# Patient Record
Sex: FEMALE | Employment: OTHER | URBAN - METROPOLITAN AREA
[De-identification: names, ages, dates, MRNs, and addresses within clinical notes are randomized per-mention and may not be internally consistent; named-entity substitution may affect disease eponyms.]

---

## 2017-09-21 RX ORDER — ACETAMINOPHEN 325 MG/1
650 TABLET ORAL EVERY 6 HOURS PRN
COMMUNITY

## 2017-09-21 RX ORDER — CITALOPRAM 40 MG/1
40 TABLET ORAL DAILY
COMMUNITY

## 2017-09-21 RX ORDER — CARBAMAZEPINE 400 MG/1
400 TABLET, EXTENDED RELEASE ORAL 2 TIMES DAILY
COMMUNITY

## 2017-09-21 RX ORDER — ASPIRIN 81 MG/1
81 TABLET ORAL DAILY
COMMUNITY

## 2017-09-21 RX ORDER — POLYETHYLENE GLYCOL 3350 17 G/17G
17 POWDER, FOR SOLUTION ORAL DAILY
COMMUNITY

## 2017-09-21 RX ORDER — B-COMPLEX WITH VITAMIN C
1 TABLET ORAL
COMMUNITY

## 2017-09-21 RX ORDER — LORAZEPAM 1 MG/1
1 TABLET ORAL 2 TIMES DAILY
COMMUNITY

## 2017-09-21 RX ORDER — RISPERIDONE 0.25 MG/1
0.25 TABLET, FILM COATED ORAL 2 TIMES DAILY
COMMUNITY

## 2017-09-21 RX ORDER — MULTIVITAMIN
1 TABLET ORAL DAILY
COMMUNITY

## 2017-09-21 RX ORDER — HYDROXYZINE 50 MG/1
50 TABLET, FILM COATED ORAL 3 TIMES DAILY
COMMUNITY

## 2017-09-21 RX ORDER — DOCUSATE SODIUM 100 MG/1
100 CAPSULE, LIQUID FILLED ORAL 2 TIMES DAILY
COMMUNITY

## 2017-09-21 RX ORDER — ATORVASTATIN CALCIUM 10 MG/1
10 TABLET, FILM COATED ORAL
COMMUNITY

## 2017-09-21 RX ORDER — IBUPROFEN 200 MG
TABLET ORAL EVERY 6 HOURS PRN
COMMUNITY

## 2017-09-22 ENCOUNTER — ANESTHESIA EVENT (OUTPATIENT)
Dept: PERIOP | Facility: HOSPITAL | Age: 47
End: 2017-09-22
Payer: MEDICARE

## 2017-09-22 PROBLEM — K05.30 ADULT PERIODONTITIS: Chronic | Status: ACTIVE | Noted: 2017-09-22

## 2017-09-25 ENCOUNTER — HOSPITAL ENCOUNTER (OUTPATIENT)
Dept: RADIOLOGY | Facility: HOSPITAL | Age: 47
Setting detail: OUTPATIENT SURGERY
Discharge: HOME | End: 2017-09-25
Payer: MEDICARE

## 2017-09-25 ENCOUNTER — ANESTHESIA (OUTPATIENT)
Dept: PERIOP | Facility: HOSPITAL | Age: 47
End: 2017-09-25
Payer: MEDICARE

## 2017-09-25 ENCOUNTER — HOSPITAL ENCOUNTER (OUTPATIENT)
Facility: HOSPITAL | Age: 47
Setting detail: OUTPATIENT SURGERY
Discharge: OTHER INSTITUTION | End: 2017-09-25
Attending: DENTIST | Admitting: DENTIST
Payer: MEDICARE

## 2017-09-25 VITALS
HEART RATE: 81 BPM | TEMPERATURE: 98.4 F | SYSTOLIC BLOOD PRESSURE: 131 MMHG | OXYGEN SATURATION: 99 % | HEIGHT: 65 IN | RESPIRATION RATE: 18 BRPM | BODY MASS INDEX: 24.09 KG/M2 | WEIGHT: 144.6 LBS | DIASTOLIC BLOOD PRESSURE: 60 MMHG

## 2017-09-25 PROBLEM — K05.30 ADULT PERIODONTITIS: Chronic | Status: RESOLVED | Noted: 2017-09-22 | Resolved: 2017-09-25

## 2017-09-25 LAB — EXT PREGNANCY TEST URINE: NEGATIVE

## 2017-09-25 PROCEDURE — 81025 URINE PREGNANCY TEST: CPT | Performed by: DENTIST

## 2017-09-25 RX ORDER — SODIUM CHLORIDE, SODIUM LACTATE, POTASSIUM CHLORIDE, CALCIUM CHLORIDE 600; 310; 30; 20 MG/100ML; MG/100ML; MG/100ML; MG/100ML
INJECTION, SOLUTION INTRAVENOUS CONTINUOUS PRN
Status: DISCONTINUED | OUTPATIENT
Start: 2017-09-25 | End: 2017-09-25 | Stop reason: SURG

## 2017-09-25 RX ORDER — CHLORHEXIDINE GLUCONATE 0.12 MG/ML
RINSE ORAL AS NEEDED
Status: DISCONTINUED | OUTPATIENT
Start: 2017-09-25 | End: 2017-09-25 | Stop reason: HOSPADM

## 2017-09-25 RX ORDER — ONDANSETRON 2 MG/ML
INJECTION INTRAMUSCULAR; INTRAVENOUS AS NEEDED
Status: DISCONTINUED | OUTPATIENT
Start: 2017-09-25 | End: 2017-09-25 | Stop reason: SURG

## 2017-09-25 RX ORDER — GLYCOPYRROLATE 0.2 MG/ML
INJECTION INTRAMUSCULAR; INTRAVENOUS AS NEEDED
Status: DISCONTINUED | OUTPATIENT
Start: 2017-09-25 | End: 2017-09-25 | Stop reason: SURG

## 2017-09-25 RX ORDER — ONDANSETRON 2 MG/ML
4 INJECTION INTRAMUSCULAR; INTRAVENOUS ONCE AS NEEDED
Status: DISCONTINUED | OUTPATIENT
Start: 2017-09-25 | End: 2017-09-25 | Stop reason: HOSPADM

## 2017-09-25 RX ORDER — FENTANYL CITRATE 50 UG/ML
INJECTION, SOLUTION INTRAMUSCULAR; INTRAVENOUS AS NEEDED
Status: DISCONTINUED | OUTPATIENT
Start: 2017-09-25 | End: 2017-09-25 | Stop reason: SURG

## 2017-09-25 RX ORDER — MAGNESIUM HYDROXIDE 1200 MG/15ML
LIQUID ORAL AS NEEDED
Status: DISCONTINUED | OUTPATIENT
Start: 2017-09-25 | End: 2017-09-25 | Stop reason: HOSPADM

## 2017-09-25 RX ORDER — CLINDAMYCIN PHOSPHATE 600 MG/50ML
600 INJECTION INTRAVENOUS
Status: DISCONTINUED | OUTPATIENT
Start: 2017-09-25 | End: 2017-09-25 | Stop reason: HOSPADM

## 2017-09-25 RX ORDER — LIDOCAINE HYDROCHLORIDE 10 MG/ML
INJECTION, SOLUTION INFILTRATION; PERINEURAL AS NEEDED
Status: DISCONTINUED | OUTPATIENT
Start: 2017-09-25 | End: 2017-09-25 | Stop reason: SURG

## 2017-09-25 RX ORDER — ROCURONIUM BROMIDE 10 MG/ML
INJECTION, SOLUTION INTRAVENOUS AS NEEDED
Status: DISCONTINUED | OUTPATIENT
Start: 2017-09-25 | End: 2017-09-25 | Stop reason: SURG

## 2017-09-25 RX ORDER — BUPIVACAINE HYDROCHLORIDE AND EPINEPHRINE 5; 5 MG/ML; UG/ML
INJECTION, SOLUTION PERINEURAL AS NEEDED
Status: DISCONTINUED | OUTPATIENT
Start: 2017-09-25 | End: 2017-09-25 | Stop reason: HOSPADM

## 2017-09-25 RX ORDER — PROPOFOL 10 MG/ML
INJECTION, EMULSION INTRAVENOUS AS NEEDED
Status: DISCONTINUED | OUTPATIENT
Start: 2017-09-25 | End: 2017-09-25 | Stop reason: SURG

## 2017-09-25 RX ADMIN — GLYCOPYRROLATE 0.6 MG: 0.2 INJECTION, SOLUTION INTRAMUSCULAR; INTRAVENOUS at 11:42

## 2017-09-25 RX ADMIN — ONDANSETRON 4 MG: 2 INJECTION INTRAMUSCULAR; INTRAVENOUS at 11:54

## 2017-09-25 RX ADMIN — FENTANYL CITRATE 100 MCG: 50 INJECTION, SOLUTION INTRAMUSCULAR; INTRAVENOUS at 10:30

## 2017-09-25 RX ADMIN — NEOSTIGMINE METHYLSULFATE 3 MG: 1 INJECTION, SOLUTION INTRAMUSCULAR; INTRAVENOUS; SUBCUTANEOUS at 11:42

## 2017-09-25 RX ADMIN — ROCURONIUM BROMIDE 30 MG: 10 INJECTION, SOLUTION INTRAVENOUS at 10:30

## 2017-09-25 RX ADMIN — DEXAMETHASONE SODIUM PHOSPHATE 8 MG: 10 INJECTION INTRAMUSCULAR; INTRAVENOUS at 10:36

## 2017-09-25 RX ADMIN — CLINDAMYCIN PHOSPHATE 600 MG: 12 INJECTION, SOLUTION INTRAMUSCULAR; INTRAVENOUS at 10:35

## 2017-09-25 RX ADMIN — SODIUM CHLORIDE, SODIUM LACTATE, POTASSIUM CHLORIDE, AND CALCIUM CHLORIDE: .6; .31; .03; .02 INJECTION, SOLUTION INTRAVENOUS at 10:23

## 2017-09-25 RX ADMIN — PROPOFOL 200 MG: 10 INJECTION, EMULSION INTRAVENOUS at 10:30

## 2017-09-25 RX ADMIN — LIDOCAINE HYDROCHLORIDE 50 MG: 10 INJECTION, SOLUTION INFILTRATION; PERINEURAL at 10:30

## 2019-11-07 ENCOUNTER — ANESTHESIA EVENT (OUTPATIENT)
Dept: PERIOP | Facility: HOSPITAL | Age: 49
End: 2019-11-07
Payer: COMMERCIAL

## 2019-11-07 NOTE — ANESTHESIA PREPROCEDURE EVALUATION
Review of Systems/Medical History  Patient summary reviewed  Chart reviewed  No history of anesthetic complications     Cardiovascular  Hyperlipidemia,    Pulmonary       GI/Hepatic    GERD ,             Endo/Other     GYN       Hematology  Anemia ,     Musculoskeletal       Neurology  Seizures well controlled,     Psychology   Psychiatric history (Autism, mental challenge, OCD, PDD), Anxiety, Depression ,              Physical Exam    Airway  Comment: Patient uncooperative           Dental   Comment: Patient uncooperative,     Cardiovascular  Rhythm: regular, Rate: normal,     Pulmonary  Breath sounds clear to auscultation,     Other Findings        Anesthesia Plan  ASA Score- 3     Anesthesia Type- general with ASA Monitors  Additional Monitors:   Airway Plan: NTT  Plan Factors-    Induction- intravenous  Postoperative Plan- Plan for postoperative opioid use  Planned trial extubation    Informed Consent- Anesthetic plan and risks discussed with healthcare power of   I personally reviewed this patient with the CRNA  Discussed and agreed on the Anesthesia Plan with the CRNA  Rachel Thurman

## 2019-11-08 ENCOUNTER — ANESTHESIA (OUTPATIENT)
Dept: PERIOP | Facility: HOSPITAL | Age: 49
End: 2019-11-08
Payer: COMMERCIAL

## 2019-11-08 ENCOUNTER — HOSPITAL ENCOUNTER (OUTPATIENT)
Facility: HOSPITAL | Age: 49
Setting detail: OUTPATIENT SURGERY
Discharge: OTHER INSTITUTION | End: 2019-11-08
Attending: DENTIST | Admitting: DENTIST
Payer: COMMERCIAL

## 2019-11-08 VITALS
OXYGEN SATURATION: 100 % | HEIGHT: 65 IN | HEART RATE: 74 BPM | SYSTOLIC BLOOD PRESSURE: 134 MMHG | WEIGHT: 152 LBS | DIASTOLIC BLOOD PRESSURE: 88 MMHG | BODY MASS INDEX: 25.33 KG/M2 | TEMPERATURE: 99.4 F | RESPIRATION RATE: 16 BRPM

## 2019-11-08 LAB
EXT PREGNANCY TEST URINE: NEGATIVE
EXT. CONTROL: NORMAL

## 2019-11-08 PROCEDURE — 81025 URINE PREGNANCY TEST: CPT | Performed by: DENTIST

## 2019-11-08 RX ORDER — PROPOFOL 10 MG/ML
INJECTION, EMULSION INTRAVENOUS AS NEEDED
Status: DISCONTINUED | OUTPATIENT
Start: 2019-11-08 | End: 2019-11-08 | Stop reason: SURG

## 2019-11-08 RX ORDER — CHLORHEXIDINE GLUCONATE 0.12 MG/ML
RINSE ORAL AS NEEDED
Status: DISCONTINUED | OUTPATIENT
Start: 2019-11-08 | End: 2019-11-08 | Stop reason: HOSPADM

## 2019-11-08 RX ORDER — LIDOCAINE HYDROCHLORIDE 10 MG/ML
INJECTION, SOLUTION INFILTRATION; PERINEURAL AS NEEDED
Status: DISCONTINUED | OUTPATIENT
Start: 2019-11-08 | End: 2019-11-08 | Stop reason: SURG

## 2019-11-08 RX ORDER — ROCURONIUM BROMIDE 10 MG/ML
INJECTION, SOLUTION INTRAVENOUS AS NEEDED
Status: DISCONTINUED | OUTPATIENT
Start: 2019-11-08 | End: 2019-11-08 | Stop reason: SURG

## 2019-11-08 RX ORDER — ONDANSETRON 2 MG/ML
INJECTION INTRAMUSCULAR; INTRAVENOUS AS NEEDED
Status: DISCONTINUED | OUTPATIENT
Start: 2019-11-08 | End: 2019-11-08 | Stop reason: SURG

## 2019-11-08 RX ORDER — GLYCOPYRROLATE 0.2 MG/ML
INJECTION INTRAMUSCULAR; INTRAVENOUS AS NEEDED
Status: DISCONTINUED | OUTPATIENT
Start: 2019-11-08 | End: 2019-11-08 | Stop reason: SURG

## 2019-11-08 RX ORDER — NEOSTIGMINE METHYLSULFATE 1 MG/ML
INJECTION INTRAVENOUS AS NEEDED
Status: DISCONTINUED | OUTPATIENT
Start: 2019-11-08 | End: 2019-11-08 | Stop reason: SURG

## 2019-11-08 RX ORDER — SODIUM CHLORIDE, SODIUM LACTATE, POTASSIUM CHLORIDE, CALCIUM CHLORIDE 600; 310; 30; 20 MG/100ML; MG/100ML; MG/100ML; MG/100ML
75 INJECTION, SOLUTION INTRAVENOUS CONTINUOUS
Status: DISCONTINUED | OUTPATIENT
Start: 2019-11-08 | End: 2019-11-08 | Stop reason: HOSPADM

## 2019-11-08 RX ORDER — CLINDAMYCIN PHOSPHATE 600 MG/50ML
600 INJECTION INTRAVENOUS ONCE
Status: COMPLETED | OUTPATIENT
Start: 2019-11-08 | End: 2019-11-08

## 2019-11-08 RX ORDER — FENTANYL CITRATE 50 UG/ML
INJECTION, SOLUTION INTRAMUSCULAR; INTRAVENOUS AS NEEDED
Status: DISCONTINUED | OUTPATIENT
Start: 2019-11-08 | End: 2019-11-08 | Stop reason: SURG

## 2019-11-08 RX ORDER — MAGNESIUM HYDROXIDE 1200 MG/15ML
LIQUID ORAL AS NEEDED
Status: DISCONTINUED | OUTPATIENT
Start: 2019-11-08 | End: 2019-11-08 | Stop reason: HOSPADM

## 2019-11-08 RX ORDER — SODIUM CHLORIDE, SODIUM LACTATE, POTASSIUM CHLORIDE, CALCIUM CHLORIDE 600; 310; 30; 20 MG/100ML; MG/100ML; MG/100ML; MG/100ML
125 INJECTION, SOLUTION INTRAVENOUS CONTINUOUS
Status: DISCONTINUED | OUTPATIENT
Start: 2019-11-08 | End: 2019-11-08 | Stop reason: SDUPTHER

## 2019-11-08 RX ORDER — BUPIVACAINE HYDROCHLORIDE AND EPINEPHRINE 5; 5 MG/ML; UG/ML
INJECTION, SOLUTION PERINEURAL AS NEEDED
Status: DISCONTINUED | OUTPATIENT
Start: 2019-11-08 | End: 2019-11-08 | Stop reason: HOSPADM

## 2019-11-08 RX ADMIN — GLYCOPYRROLATE 0.4 MG: 0.2 INJECTION, SOLUTION INTRAMUSCULAR; INTRAVENOUS at 13:35

## 2019-11-08 RX ADMIN — CLINDAMYCIN PHOSPHATE 600 MG: 12 INJECTION, SOLUTION INTRAMUSCULAR; INTRAVENOUS at 12:15

## 2019-11-08 RX ADMIN — PROPOFOL 150 MG: 10 INJECTION, EMULSION INTRAVENOUS at 12:18

## 2019-11-08 RX ADMIN — ROCURONIUM BROMIDE 30 MG: 50 INJECTION, SOLUTION INTRAVENOUS at 12:17

## 2019-11-08 RX ADMIN — LIDOCAINE HYDROCHLORIDE 50 MG: 10 INJECTION, SOLUTION INFILTRATION; PERINEURAL at 12:17

## 2019-11-08 RX ADMIN — ROCURONIUM BROMIDE 10 MG: 50 INJECTION, SOLUTION INTRAVENOUS at 12:25

## 2019-11-08 RX ADMIN — ONDANSETRON 4 MG: 2 INJECTION INTRAMUSCULAR; INTRAVENOUS at 13:41

## 2019-11-08 RX ADMIN — SODIUM CHLORIDE, SODIUM LACTATE, POTASSIUM CHLORIDE, AND CALCIUM CHLORIDE: .6; .31; .03; .02 INJECTION, SOLUTION INTRAVENOUS at 12:08

## 2019-11-08 RX ADMIN — PROPOFOL 50 MG: 10 INJECTION, EMULSION INTRAVENOUS at 12:25

## 2019-11-08 RX ADMIN — NEOSTIGMINE METHYLSULFATE 3 MG: 1 INJECTION INTRAVENOUS at 13:35

## 2019-11-08 RX ADMIN — FENTANYL CITRATE 50 MCG: 50 INJECTION, SOLUTION INTRAMUSCULAR; INTRAVENOUS at 12:48

## 2019-11-08 RX ADMIN — FENTANYL CITRATE 50 MCG: 50 INJECTION, SOLUTION INTRAMUSCULAR; INTRAVENOUS at 12:25

## 2019-11-08 NOTE — ANESTHESIA POSTPROCEDURE EVALUATION
Post-Op Assessment Note    CV Status:  Stable  Pain Score: 0    Pain management: adequate     Mental Status:  Alert and awake   Hydration Status:  Stable   PONV Controlled:  Controlled   Airway Patency:  Patent and adequate   Post Op Vitals Reviewed: Yes      Staff: CRNA           /83 (11/08/19 1345)    Temp 99 4 °F (37 4 °C) (11/08/19 1345)    Pulse 91 (11/08/19 1345)   Resp 16 (11/08/19 1345)    SpO2 98 % (11/08/19 1345)

## 2019-11-08 NOTE — DISCHARGE SUMMARY
Discharge Summary - Koby Smith 44 y o  female MRN: [id number]    Unit/Bed#: OR POOL Encounter: [id number]    Admission Date: 11/8/2019     Admitting Diagnosis: Unspecified intellectual disabilities [F79]  Chronic periodontitis, unspecified [K05 30]      Procedures Performed: No orders of the defined types were placed in this encounter  Complications: none    Discharge Diagnosis: Post-Op Diagnosis Codes:     * Unspecified intellectual disabilities [F79]     * Chronic periodontitis, unspecified [K05 30]     * Situational anxiety [F41 8]    Condition at Discharge: good     Discharge instructions/Information to patient and family:   See after visit summary for information provided to patient and family  Provisions for Follow-Up Care:  See after visit summary for information related to follow-up care and any pertinent home health orders  Disposition: See After Visit Summary for discharge disposition information  Discharge Statement   I spent on the day of discharge  I had direct contact with the patient on the day of discharge  Additional documentation is required if more than 30 minutes were spent on discharge  Discharge Medications:  See after visit summary for reconciled discharge medications provided to patient and family

## 2019-11-08 NOTE — PERIOPERATIVE NURSING NOTE
Retaining oral fluids / oob with no ill effects / scant amount oral bleeding noted / attendent remains with patient

## 2019-11-08 NOTE — OP NOTE
COMPLETE ORAL REHAB; DENTAL EXAM; FULL MOUTH DENTAL X-RAYS; PLANING AND SCALING; TOOTH EXTRACTIONS # 1, #6, #12, #13, #25, #28 AND #29; PROPHYLAXIS; FLUORIDE TREATMENT  Procedure Note    Toy Wade  11/8/2019    Pre-op Diagnosis:   Unspecified intellectual disabilities [F79]  Chronic periodontitis, unspecified [K05 30]       Post-Op Diagnosis Codes:     * Unspecified intellectual disabilities [F79]     * Chronic periodontitis, unspecified [K05 30]     * Situational anxiety [F41 8]    Procedure(s):  COMPLETE ORAL REHAB; DENTAL EXAM; FULL MOUTH DENTAL X-RAYS; PLANING AND SCALING; TOOTH EXTRACTIONS # 1, #6, #12, #13, #25, #28 AND #29; PROPHYLAXIS; FLUORIDE TREATMENT  Surgeon(s) and Role:     * Deedee Kwon DMD - Primary     Anesthesia: General    Staff:   Circulator: King Xiong RN  Relief Circulator: Jeffery Lawson RN  Relief Scrub: Milan Ormond, RN  Scrub Person: Kavitha Webb  [facility]    Estimated Blood Loss: Minimal    Specimens:                * No orders in the log *    Drains:  None    Procedure:   After the Induction of IV inhalation anesthesia with nasal tracheal intubation the patient was prepped and draped for intraoral dental procedure  A time-out identifying the patient and procedure was completed  Eleven digital dental periapical radiographs were taken and processed  A posterior pharyngeal pack was inserted of cotton gauze with tails  Visual examination of the patient's oral cavity and dentition was accomplished and related to the radiographic findings  Extensive plaque and calculus deposits present  Gingival tissues were erythematous and edematous  Generalized mobility of teeth due to horizontal and vertical bone loss  Teeth numbers 1, 6, 12, 13, 25, 28 and 29 exhibited class 4 mobility  After the infiltration of 3 6 mL of Marcaine 0 5% with 1 to [id number] epinephrine the following dental procedures were completed    Debridement of the dentition and gingival tissues utilizing ultrasonic scaling and hand instrumentation  A dental prophylaxis was completed  Teeth numbers 1, 6, 12, 13, 25, 28 and 29 were elevated and removed with forceps  Socket sites were curetted  Gelfoam and 3-0 Vicryl sutures placed at the extraction sites  Application of fluoride varnish to all teeth  The oral cavity was then irrigated and suctioned  The posterior pharyngeal pack was removed and the oropharyngeal area suctioned  Patient left the operating room in stable condition and was transported to the postanesthesia care unit      Complications:  None      Sharon Diaz DMD     Date: 11/8/2019  Time: 1:47 PM

## 2019-11-12 ENCOUNTER — HOSPITAL ENCOUNTER (OUTPATIENT)
Dept: RADIOLOGY | Facility: HOSPITAL | Age: 49
Discharge: HOME | End: 2019-11-12

## 2019-11-12 DIAGNOSIS — Z00.00 ROUTINE ADULT HEALTH MAINTENANCE: ICD-10-CM

## 2023-04-10 RX ORDER — CALCIUM CARBONATE 200(500)MG
1 TABLET,CHEWABLE ORAL 2 TIMES DAILY
COMMUNITY

## 2023-04-10 RX ORDER — BENZTROPINE MESYLATE 0.5 MG/1
0.5 TABLET ORAL 2 TIMES DAILY
COMMUNITY

## 2023-04-10 NOTE — PRE-PROCEDURE INSTRUCTIONS
My Surgical Experience    The following information was developed to assist you to prepare for your operation  What do I need to do before coming to the hospital?  • Arrange for a responsible person to drive you to and from the hospital   • Arrange care for your children at home  Children are not allowed in the recovery areas of the hospital  • Plan to wear clothing that is easy to put on and take off  If you are having shoulder surgery, wear a shirt that buttons or zippers in the front  Bathing  o Shower the evening before and the morning of your surgery with an antibacterial soap  Please refer to the Pre Op Showering Instructions for Surgery Patients Sheet   o Remove nail polish and all body piercing jewelry  o Do not shave any body part for at least 24 hours before surgery-this includes face, arms, legs and upper body  Food  o Nothing to eat or drink after midnight the night before your surgery  This includes candy and chewing gum  o Exception: If your surgery is after 12:00pm (noon), you may have clear liquids such as 7-Up®, ginger ale, apple or cranberry juice, Jell-O®, water, or clear broth until 8:00 am  o Do not drink milk or juice with pulp on the morning before surgery  o Do not drink alcohol 24 hours before surgery  Medicine  o Follow instructions you received from your surgeon about which medicines you may take on the day of surgery  o If instructed to take medicine on the morning of surgery, take pills with just a small sip of water  Call your prescribing doctor for specific infroamtion on what to do if you take insulin    What should I bring to the hospital?    Bring:  • Crutches or a walker, if you have them, for foot or knee surgery  • A list of the daily medicines, vitamins, minerals, herbals and nutritional supplements you take   Include the dosages of medicines and the time you take them each day  • Glasses, dentures or hearing aids  • Minimal clothing; you will be wearing hospital sleepwear  • Photo ID; required to verify your identity  • If you have a Living Will or Power of , bring a copy of the documents  • If you have an ostomy, bring an extra pouch and any supplies you use    Do not bring  • Medicines or inhalers  • Money, valuables or jewelry    What other information should I know about the day of surgery? • Notify your surgeons if you develop a cold, sore throat, cough, fever, rash or any other illness  • Report to the Ambulatory Surgical/Same Day Surgery Unit  • You will be instructed to stop at Registration only if you have not been pre-registered  • Inform your  fi they do not stay that they will be asked by the staff to leave a phone number where they can be reached  • Be available to be reached before surgery  In the event the operating room schedule changes, you may be asked to come in earlier or later than expected    *It is important to tell your doctor and others involved in your health care if you are taking or have been taking any non-prescription drugs, vitamins, minerals, herbals or other nutritional supplements  Any of these may interact with some food or medicines and cause a reaction      Pre-Surgery Instructions:   Medication Instructions   • acetaminophen (TYLENOL) 325 mg tablet Hold day of surgery  • aspirin (ECOTRIN LOW STRENGTH) 81 mg EC tablet Hold day of surgery  • atorvastatin (LIPITOR) 10 mg tablet Take night before surgery   • benztropine (COGENTIN) 0 5 mg tablet Take day of surgery  • bisacodyl (DULCOLAX) 5 mg EC tablet Hold day of surgery  • calcium carbonate (TUMS) 500 mg chewable tablet Hold day of surgery  • carBAMazepine (TEGretol XR) 400 mg 12 hr tablet Take day of surgery  • citalopram (CeleXA) 40 mg tablet Take night before surgery   • docusate sodium (COLACE) 100 mg capsule Hold day of surgery  • esomeprazole (NexIUM) 20 mg capsule Take day of surgery  • fluocinonide (LIDEX) 0 05 % cream Hold day of surgery     • haloperidol (HALDOL) 1 mg/0 5 mL oral concentrated solution Take day of surgery  • hydrOXYzine HCL (ATARAX) 50 mg tablet Take day of surgery  • ibuprofen (MOTRIN) 200 mg tablet Hold day of surgery  • LORazepam (ATIVAN) 1 mg tablet Take day of surgery  • Multiple Vitamin (MULTIVITAMIN) tablet Hold day of surgery  • polyethylene glycol (MIRALAX) 17 g packet Hold day of surgery  • sodium chloride 1 g tablet Hold day of surgery

## 2023-04-20 ENCOUNTER — ANESTHESIA EVENT (OUTPATIENT)
Dept: PERIOP | Facility: HOSPITAL | Age: 53
End: 2023-04-20

## 2023-04-21 ENCOUNTER — HOSPITAL ENCOUNTER (OUTPATIENT)
Facility: HOSPITAL | Age: 53
Setting detail: OUTPATIENT SURGERY
Discharge: ANOTHER HOSPITAL | End: 2023-04-21
Attending: DENTIST | Admitting: DENTIST

## 2023-04-21 ENCOUNTER — APPOINTMENT (OUTPATIENT)
Dept: RADIOLOGY | Facility: HOSPITAL | Age: 53
End: 2023-04-21

## 2023-04-21 ENCOUNTER — ANESTHESIA (OUTPATIENT)
Dept: PERIOP | Facility: HOSPITAL | Age: 53
End: 2023-04-21

## 2023-04-21 VITALS
RESPIRATION RATE: 18 BRPM | OXYGEN SATURATION: 99 % | WEIGHT: 140.6 LBS | TEMPERATURE: 98.1 F | DIASTOLIC BLOOD PRESSURE: 80 MMHG | BODY MASS INDEX: 23.4 KG/M2 | HEART RATE: 65 BPM | SYSTOLIC BLOOD PRESSURE: 160 MMHG

## 2023-04-21 PROBLEM — R56.9 SEIZURES (HCC): Status: ACTIVE | Noted: 2023-04-21

## 2023-04-21 PROBLEM — K21.9 GASTROESOPHAGEAL REFLUX DISEASE: Status: ACTIVE | Noted: 2023-04-21

## 2023-04-21 PROBLEM — F32.A DEPRESSION: Status: ACTIVE | Noted: 2023-04-21

## 2023-04-21 PROBLEM — E78.5 HYPERLIPIDEMIA: Status: ACTIVE | Noted: 2023-04-21

## 2023-04-21 RX ORDER — FENTANYL CITRATE 50 UG/ML
INJECTION, SOLUTION INTRAMUSCULAR; INTRAVENOUS AS NEEDED
Status: DISCONTINUED | OUTPATIENT
Start: 2023-04-21 | End: 2023-04-21

## 2023-04-21 RX ORDER — SODIUM CHLORIDE, SODIUM LACTATE, POTASSIUM CHLORIDE, CALCIUM CHLORIDE 600; 310; 30; 20 MG/100ML; MG/100ML; MG/100ML; MG/100ML
50 INJECTION, SOLUTION INTRAVENOUS CONTINUOUS
Status: CANCELLED | OUTPATIENT
Start: 2023-04-21

## 2023-04-21 RX ORDER — ONDANSETRON 2 MG/ML
INJECTION INTRAMUSCULAR; INTRAVENOUS AS NEEDED
Status: DISCONTINUED | OUTPATIENT
Start: 2023-04-21 | End: 2023-04-21

## 2023-04-21 RX ORDER — CLINDAMYCIN PHOSPHATE 600 MG/50ML
600 INJECTION INTRAVENOUS
Status: DISCONTINUED | OUTPATIENT
Start: 2023-04-21 | End: 2023-04-21 | Stop reason: HOSPADM

## 2023-04-21 RX ORDER — PROPOFOL 10 MG/ML
INJECTION, EMULSION INTRAVENOUS AS NEEDED
Status: DISCONTINUED | OUTPATIENT
Start: 2023-04-21 | End: 2023-04-21

## 2023-04-21 RX ORDER — BUPIVACAINE HYDROCHLORIDE AND EPINEPHRINE 5; 5 MG/ML; UG/ML
INJECTION, SOLUTION PERINEURAL AS NEEDED
Status: DISCONTINUED | OUTPATIENT
Start: 2023-04-21 | End: 2023-04-21 | Stop reason: HOSPADM

## 2023-04-21 RX ORDER — SODIUM CHLORIDE, SODIUM LACTATE, POTASSIUM CHLORIDE, CALCIUM CHLORIDE 600; 310; 30; 20 MG/100ML; MG/100ML; MG/100ML; MG/100ML
INJECTION, SOLUTION INTRAVENOUS CONTINUOUS PRN
Status: DISCONTINUED | OUTPATIENT
Start: 2023-04-21 | End: 2023-04-21

## 2023-04-21 RX ORDER — CHLORHEXIDINE GLUCONATE 0.12 MG/ML
RINSE ORAL AS NEEDED
Status: DISCONTINUED | OUTPATIENT
Start: 2023-04-21 | End: 2023-04-21 | Stop reason: HOSPADM

## 2023-04-21 RX ORDER — LIDOCAINE HYDROCHLORIDE 20 MG/ML
INJECTION, SOLUTION EPIDURAL; INFILTRATION; INTRACAUDAL; PERINEURAL AS NEEDED
Status: DISCONTINUED | OUTPATIENT
Start: 2023-04-21 | End: 2023-04-21

## 2023-04-21 RX ORDER — GLYCOPYRROLATE 0.2 MG/ML
INJECTION INTRAMUSCULAR; INTRAVENOUS AS NEEDED
Status: DISCONTINUED | OUTPATIENT
Start: 2023-04-21 | End: 2023-04-21

## 2023-04-21 RX ORDER — MAGNESIUM HYDROXIDE 1200 MG/15ML
LIQUID ORAL AS NEEDED
Status: DISCONTINUED | OUTPATIENT
Start: 2023-04-21 | End: 2023-04-21 | Stop reason: HOSPADM

## 2023-04-21 RX ORDER — ROCURONIUM BROMIDE 10 MG/ML
INJECTION, SOLUTION INTRAVENOUS AS NEEDED
Status: DISCONTINUED | OUTPATIENT
Start: 2023-04-21 | End: 2023-04-21

## 2023-04-21 RX ADMIN — LIDOCAINE HYDROCHLORIDE 100 MG: 20 INJECTION, SOLUTION EPIDURAL; INFILTRATION; INTRACAUDAL; PERINEURAL at 08:13

## 2023-04-21 RX ADMIN — SUGAMMADEX 200 MG: 100 INJECTION, SOLUTION INTRAVENOUS at 09:31

## 2023-04-21 RX ADMIN — ONDANSETRON 4 MG: 2 INJECTION INTRAMUSCULAR; INTRAVENOUS at 08:13

## 2023-04-21 RX ADMIN — GLYCOPYRROLATE 0.1 MCG: 0.2 INJECTION, SOLUTION INTRAMUSCULAR; INTRAVENOUS at 08:45

## 2023-04-21 RX ADMIN — FENTANYL CITRATE 50 MCG: 50 INJECTION, SOLUTION INTRAMUSCULAR; INTRAVENOUS at 08:36

## 2023-04-21 RX ADMIN — ROCURONIUM BROMIDE 40 MG: 10 INJECTION, SOLUTION INTRAVENOUS at 08:13

## 2023-04-21 RX ADMIN — FENTANYL CITRATE 50 MCG: 50 INJECTION, SOLUTION INTRAMUSCULAR; INTRAVENOUS at 08:13

## 2023-04-21 RX ADMIN — SODIUM CHLORIDE, SODIUM LACTATE, POTASSIUM CHLORIDE, AND CALCIUM CHLORIDE: .6; .31; .03; .02 INJECTION, SOLUTION INTRAVENOUS at 08:10

## 2023-04-21 RX ADMIN — PROPOFOL 150 MG: 10 INJECTION, EMULSION INTRAVENOUS at 08:13

## 2023-04-21 RX ADMIN — CLINDAMYCIN PHOSPHATE 600 MG: 600 INJECTION, SOLUTION INTRAVENOUS at 08:23

## 2023-04-21 NOTE — OP NOTE
COMPLETE ORAL REHABILITATION, DENTAL EXAM, FULL MOUTH DENTAL Veleria Leer AND SCALINE, GINGIVECTOMY, PERIO CHARTING, PROPHYLAXIS, D/SENSE, FLUORIDE, EXTRACTIONS #14, #26, #27  Postoperative Note  PATIENT NAME: Lucious Lennox  : 1970  MRN: [id number]  [facility] ROOM 02    Surgery Date: 2023    Intellectual disability [F79]  Chronic periodontitis [K05 30]    Post-Op Diagnosis Codes:     * Intellectual disability [F79]     * Chronic periodontitis [K05 30]    Procedure(s):  COMPLETE ORAL REHABILITATION, DENTAL EXAM, FULL MOUTH DENTAL XRAYS,PLANING AND SCALINE, GINGIVECTOMY, PERIO CHARTING, PROPHYLAXIS, D/SENSE, FLUORIDE, EXTRACTIONS #14, #26, #27    Surgeon(s) and Role:     * Serafin Castellanos DMD - Primary    Specimens:  none    Estimated Blood Loss:   Minimal    Anesthesia Type:   General     Operative report:            Complications:   None    SIGNATURE: Serafin Castellanos DMD   DATE: 2023   TIME: 9:34 AM COMPLETE ORAL REHABILITATION, DENTAL EXAM, FULL MOUTH DENTAL XRAYS,PLANING AND SCALINE, GINGIVECTOMY, PERIO CHARTING, PROPHYLAXIS, D/SENSE, FLUORIDE, EXTRACTIONS #14, #26, #27  Postoperative Note  PATIENT NAME: Lucious Lennox  : 1970  MRN: [id number]  [facility]    Surgery Date: 2023    Intellectual disability [F79]  Chronic periodontitis [K05 30]    Post-Op Diagnosis Codes:     * Intellectual disability [F79]     * Chronic periodontitis [K05 30]    Procedure(s):  COMPLETE ORAL REHABILITATION, DENTAL EXAM, FULL MOUTH DENTAL XRAYS,PLANING AND SCALINE, GINGIVECTOMY, PERIO CHARTING, PROPHYLAXIS, D/SENSE, FLUORIDE, EXTRACTIONS #14, #26, #27    Surgeon(s) and Role:     * Serafin Castellanos DMD - Primary    Specimens:  none    Estimated Blood Loss:   Minimal    Anesthesia Type:   General     Operative report:            Complications:   None    SIGNATURE: Serafin Castellanos DMD   DATE: 2023   TIME: 9:34 AMOPERATIVE REPORT  PATIENT NAME: Lucious Lennox    :  1970  MRN: [id number]  Pt Location: [facility] ROOM 02    SURGERY DATE: 4/21/2023    Surgeon(s) and Role:     * Kayleigh Echols DMD - Primary    Preop Diagnosis:  Intellectual disability [F79]  Chronic periodontitis [K05 30]    Post-Op Diagnosis Codes:     * Intellectual disability [F79]     * Chronic periodontitis [K05 30]    Procedure(s):  COMPLETE ORAL REHABILITATION  DENTAL EXAM  FULL MOUTH DENTAL XRAYS  PLANING AND SCALINE  GINGIVECTOMY  PERIO CHARTING  PROPHYLAXIS  D/SENSE  FLUORIDE  EXTRACTIONS #14  #26  #27    Specimen(s):  * No specimens in log *    Estimated Blood Loss:   Minimal    Drains:  * No LDAs found *    Anesthesia Type:   General    Operative Indications:  Intellectual disability [F79]  Chronic periodontitis [K05 30]  Chronic adult periodontitis, multiple abscessed teeth, severe malocclusion, gingival hyperplasia  Operative Findings:      Complications:   None    Procedure and Technique: Following the induction of IV inhalation anesthesia with nasotracheal intubation, this patient was prepped and draped for an intraoral dental surgical procedure  14 periapical dental radiographs were exposed and stored  Preliminary treatment plan formulated at that time  Following insertion of a posterior pharyngeal pack with Ray-Sherri gauze single tail moistened debridement of the dentition was completed utilizing ultrasonic scaling with the [address] Cavitron unit  Both large and slim tip inserts were utilized  Hand-held curettes and 's use were appropriate  Comprehensive evaluation the patient's oral cavity and teeth was then completed  This information was related to the aforementioned radiographic survey whereby definitive treatment plan was then set forth  Following administration of 9 0 mL Marcaine 0 5% epi concentration 1-200,000 patient had gingivectomy surgery in all 4 quadrants  Upper right, upper left, lower left, lower right    The Bovie electrocautery unit was utilized the setting was #25 for both cutting and coagulation  [place] tip-117 was utilized and hemostasis achieved in a coagulative modality  Attention was then directed to nonrestorable teeth  These were extracted using standard elevators and forceps Gelfoam and eight 3-0 Vicryl sutures used for closure  Teeth numbers 14, 26, and 27 were extracted  Patient also had adult prophylaxis, desensitizing medicaments fluoride treatment performed for all remaining dentition  This patient did receive 600 mg of Cleocin intravenously during the surgery  The posterior pharyngeal pack was removed and the oropharyngeal area suction irrigated with sterile saline in the usual manner  The patient left the operating room in satisfactory condition and was transported to the PACU without incident  I was present for the entire procedure      Patient Disposition:  PACU         SIGNATURE: Tina Anthony DMD  DATE: April 21, 2023  TIME: 9:34 AM

## 2023-04-21 NOTE — ANESTHESIA POSTPROCEDURE EVALUATION
Post-Op Assessment Note    CV Status:  Stable  Pain Score: 0    Pain management: adequate     Mental Status:  Awake   Hydration Status:  Euvolemic and stable   PONV Controlled:  Controlled   Airway Patency:  Patent      Post Op Vitals Reviewed: Yes      Staff: CRNA         No notable events documented      /77 (04/21/23 0941)    Temp 98 1 °F (36 7 °C) (04/21/23 0941)    Pulse 60 (04/21/23 0941)   Resp 18 (04/21/23 0941)    SpO2 98 % (04/21/23 0941)

## 2023-04-21 NOTE — QUICK NOTE
H&P Interval Update      H&P performed by Dr Miguelina Harrison on 3/31/23 was reviewed  After examining the patient I find no changes in the patients condition since the H&P had been written       /91   Pulse 77   Temp 97 9 °F (36 6 °C) (Temporal)   Resp 18   Wt 63 8 kg (140 lb 9 6 oz)   LMP 09/22/2017   SpO2 94%   BMI 23 40 kg/m²             General:  alert, awake, tearful  Head: atraumatic  Heart:  regular rate and rhythm  Lungs: Clear to auscultation bilaterally  Abdomen:  soft, non distended, no tenderness to palpation  Extremities:  no erythema, no edema

## 2023-04-21 NOTE — ANESTHESIA PREPROCEDURE EVALUATION
Procedure:  COMPLETE ORAL REHABILITATION (Mouth)    Relevant Problems   CARDIO   (+) Hyperlipidemia      GI/HEPATIC   (+) Gastroesophageal reflux disease      NEURO/PSYCH   (+) Depression   (+) Seizures (HCC)        Physical Exam    Airway    Mallampati score: II  TM Distance: >3 FB  Neck ROM: full     Dental   No notable dental hx     Cardiovascular  Cardiovascular exam normal    Pulmonary  Pulmonary exam normal     Other Findings    Autism    Anesthesia Plan  ASA Score- 3     Anesthesia Type- general with ASA Monitors  Additional Monitors:   Airway Plan: NTT  Plan Factors-    Chart reviewed  Imaging results reviewed  Existing labs reviewed  Patient summary reviewed  Patient is not a current smoker  Induction- intravenous  Postoperative Plan-     Informed Consent- Anesthetic plan and risks discussed with legal guardian  I personally reviewed this patient with the CRNA  Discussed and agreed on the Anesthesia Plan with the CRNA  Maki Hale

## 2023-04-24 RX ORDER — HYDROMORPHONE HCL/PF 1 MG/ML
0.2 SYRINGE (ML) INJECTION
Status: ACTIVE | OUTPATIENT
Start: 2023-04-24

## 2023-04-24 RX ORDER — ONDANSETRON 2 MG/ML
4 INJECTION INTRAMUSCULAR; INTRAVENOUS ONCE AS NEEDED
Status: ACTIVE | OUTPATIENT
Start: 2023-04-24

## 2023-04-24 RX ORDER — FENTANYL CITRATE/PF 50 MCG/ML
25 SYRINGE (ML) INJECTION
Status: ACTIVE | OUTPATIENT
Start: 2023-04-24

## (undated) DEVICE — Device

## (undated) DEVICE — ROCKER SWITCH PENCIL HOLSTER: Brand: VALLEYLAB

## (undated) DEVICE — SUT VICRYL 3-0 PS-1 18 IN J683G

## (undated) DEVICE — SURGIFOAM 7 X 12 SPONGE ABS

## (undated) DEVICE — GLOVE INDICATOR PI UNDERGLOVE SZ 8.5 BLUE

## (undated) DEVICE — MONOJECT NEEDLES 27 GA SHORT METAL HUB

## (undated) DEVICE — DENTAL PACK: Brand: CARDINAL HEALTH

## (undated) DEVICE — DISPOS-A BRUSH STANDARD

## (undated) DEVICE — SUT VICRYL 3-0 PS-2 18 IN J497G

## (undated) DEVICE — SPECIMEN SOCK - SHORT: Brand: MEDI-VAC

## (undated) DEVICE — DISPOS-A-BRUSH FINE BRISTLE

## (undated) DEVICE — GROUNDING PAD UNIVERSAL SLW

## (undated) DEVICE — MICRODISSECTION NEEDLE: Brand: COLORADO

## (undated) DEVICE — GLOVE SRG BIOGEL 8

## (undated) DEVICE — PROPHY ANGLE FIRM WHITE DISP LF

## (undated) DEVICE — GARMENT STANDARD CALF 17IN

## (undated) DEVICE — ASTOUND STANDARD SURGICAL GOWN, XL: Brand: CONVERTORS

## (undated) DEVICE — ACCLEAN PROPHY PASTE COARSE: Brand: HENRY SCHEIN

## (undated) DEVICE — GLOVE INDICATOR PI UNDERGLOVE SZ 8 BLUE

## (undated) DEVICE — SYRINGE BRIXTON AIRWATER SLEEVE CLEAR

## (undated) DEVICE — PENCIL ELECTROSURG E-Z CLEAN -0035H

## (undated) DEVICE — ELECTRODE NEEDLE MEGAFINE E-Z CLEAN 2IN 45DEG -0119

## (undated) DEVICE — SANI-TIP® DISPOSABLE AIR/WATER SYRINGE TIP. CONTENTS:  STANDARD REGULAR KIT - 250 TIPS AND 250 SANI-SHIELD® SLEEVES.: Brand: SANI-TIP®